# Patient Record
Sex: MALE | Employment: FULL TIME | URBAN - METROPOLITAN AREA
[De-identification: names, ages, dates, MRNs, and addresses within clinical notes are randomized per-mention and may not be internally consistent; named-entity substitution may affect disease eponyms.]

---

## 2019-10-09 ENCOUNTER — HOSPITAL ENCOUNTER (EMERGENCY)
Age: 59
Discharge: HOME | End: 2019-10-10
Attending: EMERGENCY MEDICINE
Payer: COMMERCIAL

## 2019-10-09 ENCOUNTER — APPOINTMENT (OUTPATIENT)
Dept: CT IMAGING | Age: 59
End: 2019-10-09
Attending: STUDENT IN AN ORGANIZED HEALTH CARE EDUCATION/TRAINING PROGRAM
Payer: COMMERCIAL

## 2019-10-09 ENCOUNTER — APPOINTMENT (OUTPATIENT)
Dept: GENERAL RADIOLOGY | Age: 59
End: 2019-10-09
Attending: STUDENT IN AN ORGANIZED HEALTH CARE EDUCATION/TRAINING PROGRAM
Payer: COMMERCIAL

## 2019-10-09 DIAGNOSIS — M54.9 ACUTE BACK PAIN LESS THAN 4 WEEKS DURATION: Primary | ICD-10-CM

## 2019-10-09 LAB
ALBUMIN SERPL-MCNC: 4.1 G/DL (ref 3.4–5)
ALBUMIN/GLOB SERPL: 1.1 {RATIO} (ref 0.8–1.7)
ALP SERPL-CCNC: 118 U/L (ref 45–117)
ALT SERPL-CCNC: 49 U/L (ref 16–61)
ANION GAP SERPL CALC-SCNC: 5 MMOL/L (ref 3–18)
APPEARANCE UR: CLEAR
AST SERPL-CCNC: 16 U/L (ref 10–38)
BACTERIA URNS QL MICRO: NEGATIVE /HPF
BASOPHILS # BLD: 0 K/UL (ref 0–0.1)
BASOPHILS NFR BLD: 1 % (ref 0–2)
BILIRUB SERPL-MCNC: 0.3 MG/DL (ref 0.2–1)
BILIRUB UR QL: NEGATIVE
BUN SERPL-MCNC: 16 MG/DL (ref 7–18)
BUN/CREAT SERPL: 20 (ref 12–20)
CALCIUM SERPL-MCNC: 8.8 MG/DL (ref 8.5–10.1)
CHLORIDE SERPL-SCNC: 106 MMOL/L (ref 100–111)
CO2 SERPL-SCNC: 26 MMOL/L (ref 21–32)
COLOR UR: YELLOW
CREAT SERPL-MCNC: 0.8 MG/DL (ref 0.6–1.3)
CRP SERPL-MCNC: <0.3 MG/DL (ref 0–0.3)
DIFFERENTIAL METHOD BLD: ABNORMAL
EOSINOPHIL # BLD: 0.1 K/UL (ref 0–0.4)
EOSINOPHIL NFR BLD: 2 % (ref 0–5)
EPITH CASTS URNS QL MICRO: ABNORMAL /LPF (ref 0–5)
ERYTHROCYTE [DISTWIDTH] IN BLOOD BY AUTOMATED COUNT: 13 % (ref 11.6–14.5)
GLOBULIN SER CALC-MCNC: 3.8 G/DL (ref 2–4)
GLUCOSE SERPL-MCNC: 119 MG/DL (ref 74–99)
GLUCOSE UR STRIP.AUTO-MCNC: NEGATIVE MG/DL
HCT VFR BLD AUTO: 52.1 % (ref 36–48)
HGB BLD-MCNC: 18.5 G/DL (ref 13–16)
HGB UR QL STRIP: ABNORMAL
KETONES UR QL STRIP.AUTO: NEGATIVE MG/DL
LEUKOCYTE ESTERASE UR QL STRIP.AUTO: NEGATIVE
LIPASE SERPL-CCNC: 94 U/L (ref 73–393)
LYMPHOCYTES # BLD: 1.4 K/UL (ref 0.9–3.6)
LYMPHOCYTES NFR BLD: 20 % (ref 21–52)
MCH RBC QN AUTO: 30.9 PG (ref 24–34)
MCHC RBC AUTO-ENTMCNC: 35.5 G/DL (ref 31–37)
MCV RBC AUTO: 87.1 FL (ref 74–97)
MONOCYTES # BLD: 0.5 K/UL (ref 0.05–1.2)
MONOCYTES NFR BLD: 7 % (ref 3–10)
MUCOUS THREADS URNS QL MICRO: ABNORMAL /LPF
NEUTS SEG # BLD: 5.2 K/UL (ref 1.8–8)
NEUTS SEG NFR BLD: 70 % (ref 40–73)
NITRITE UR QL STRIP.AUTO: NEGATIVE
PH UR STRIP: 5.5 [PH] (ref 5–8)
PLATELET # BLD AUTO: 213 K/UL (ref 135–420)
PMV BLD AUTO: 10.2 FL (ref 9.2–11.8)
POTASSIUM SERPL-SCNC: 3.7 MMOL/L (ref 3.5–5.5)
PROT SERPL-MCNC: 7.9 G/DL (ref 6.4–8.2)
PROT UR STRIP-MCNC: ABNORMAL MG/DL
RBC # BLD AUTO: 5.98 M/UL (ref 4.7–5.5)
RBC #/AREA URNS HPF: NEGATIVE /HPF (ref 0–5)
SODIUM SERPL-SCNC: 137 MMOL/L (ref 136–145)
SP GR UR REFRACTOMETRY: 1.02 (ref 1–1.03)
TROPONIN I SERPL-MCNC: <0.02 NG/ML (ref 0–0.04)
UROBILINOGEN UR QL STRIP.AUTO: 1 EU/DL (ref 0.2–1)
WBC # BLD AUTO: 7.3 K/UL (ref 4.6–13.2)
WBC URNS QL MICRO: ABNORMAL /HPF (ref 0–4)

## 2019-10-09 PROCEDURE — 96374 THER/PROPH/DIAG INJ IV PUSH: CPT

## 2019-10-09 PROCEDURE — 74176 CT ABD & PELVIS W/O CONTRAST: CPT

## 2019-10-09 PROCEDURE — 99284 EMERGENCY DEPT VISIT MOD MDM: CPT

## 2019-10-09 PROCEDURE — 81001 URINALYSIS AUTO W/SCOPE: CPT

## 2019-10-09 PROCEDURE — 80053 COMPREHEN METABOLIC PANEL: CPT

## 2019-10-09 PROCEDURE — 74011250636 HC RX REV CODE- 250/636: Performed by: STUDENT IN AN ORGANIZED HEALTH CARE EDUCATION/TRAINING PROGRAM

## 2019-10-09 PROCEDURE — 74011250637 HC RX REV CODE- 250/637: Performed by: STUDENT IN AN ORGANIZED HEALTH CARE EDUCATION/TRAINING PROGRAM

## 2019-10-09 PROCEDURE — 86140 C-REACTIVE PROTEIN: CPT

## 2019-10-09 PROCEDURE — 85025 COMPLETE CBC W/AUTO DIFF WBC: CPT

## 2019-10-09 PROCEDURE — 96375 TX/PRO/DX INJ NEW DRUG ADDON: CPT

## 2019-10-09 PROCEDURE — 74011250636 HC RX REV CODE- 250/636: Performed by: EMERGENCY MEDICINE

## 2019-10-09 PROCEDURE — 74011250637 HC RX REV CODE- 250/637: Performed by: EMERGENCY MEDICINE

## 2019-10-09 PROCEDURE — 74011250637 HC RX REV CODE- 250/637: Performed by: PHYSICIAN ASSISTANT

## 2019-10-09 PROCEDURE — 71045 X-RAY EXAM CHEST 1 VIEW: CPT

## 2019-10-09 PROCEDURE — 84484 ASSAY OF TROPONIN QUANT: CPT

## 2019-10-09 PROCEDURE — 83690 ASSAY OF LIPASE: CPT

## 2019-10-09 RX ORDER — ONDANSETRON 4 MG/1
4 TABLET, ORALLY DISINTEGRATING ORAL
Status: COMPLETED | OUTPATIENT
Start: 2019-10-09 | End: 2019-10-09

## 2019-10-09 RX ORDER — LIDOCAINE 4 G/100G
1 PATCH TOPICAL EVERY 24 HOURS
Status: DISCONTINUED | OUTPATIENT
Start: 2019-10-09 | End: 2019-10-10 | Stop reason: HOSPADM

## 2019-10-09 RX ORDER — LABETALOL HCL 20 MG/4 ML
10 SYRINGE (ML) INTRAVENOUS
Status: COMPLETED | OUTPATIENT
Start: 2019-10-09 | End: 2019-10-09

## 2019-10-09 RX ORDER — HYDROMORPHONE HYDROCHLORIDE 1 MG/ML
0.5 INJECTION, SOLUTION INTRAMUSCULAR; INTRAVENOUS; SUBCUTANEOUS ONCE
Status: DISCONTINUED | OUTPATIENT
Start: 2019-10-09 | End: 2019-10-09

## 2019-10-09 RX ORDER — LIDOCAINE 50 MG/G
PATCH TOPICAL
Qty: 8 EACH | Refills: 0 | Status: SHIPPED | OUTPATIENT
Start: 2019-10-09

## 2019-10-09 RX ORDER — ACETAMINOPHEN 500 MG
1000 TABLET ORAL
Status: COMPLETED | OUTPATIENT
Start: 2019-10-09 | End: 2019-10-09

## 2019-10-09 RX ORDER — KETOROLAC TROMETHAMINE 15 MG/ML
15 INJECTION, SOLUTION INTRAMUSCULAR; INTRAVENOUS
Status: COMPLETED | OUTPATIENT
Start: 2019-10-09 | End: 2019-10-09

## 2019-10-09 RX ORDER — OXYCODONE AND ACETAMINOPHEN 5; 325 MG/1; MG/1
1 TABLET ORAL
Status: COMPLETED | OUTPATIENT
Start: 2019-10-09 | End: 2019-10-09

## 2019-10-09 RX ORDER — METOPROLOL TARTRATE 50 MG/1
50 TABLET ORAL ONCE
Status: COMPLETED | OUTPATIENT
Start: 2019-10-09 | End: 2019-10-09

## 2019-10-09 RX ORDER — IBUPROFEN 600 MG/1
600 TABLET ORAL
Qty: 20 TAB | Refills: 0 | Status: SHIPPED | OUTPATIENT
Start: 2019-10-09

## 2019-10-09 RX ORDER — MORPHINE SULFATE 4 MG/ML
4 INJECTION, SOLUTION INTRAMUSCULAR; INTRAVENOUS
Status: COMPLETED | OUTPATIENT
Start: 2019-10-09 | End: 2019-10-09

## 2019-10-09 RX ORDER — LABETALOL HYDROCHLORIDE 5 MG/ML
20 INJECTION, SOLUTION INTRAVENOUS ONCE
Status: DISCONTINUED | OUTPATIENT
Start: 2019-10-09 | End: 2019-10-09

## 2019-10-09 RX ORDER — OXYCODONE HYDROCHLORIDE 5 MG/1
5 TABLET ORAL
Qty: 7 TAB | Refills: 0 | Status: SHIPPED | OUTPATIENT
Start: 2019-10-09 | End: 2019-10-12

## 2019-10-09 RX ADMIN — OXYCODONE HYDROCHLORIDE AND ACETAMINOPHEN 1 TABLET: 5; 325 TABLET ORAL at 20:46

## 2019-10-09 RX ADMIN — LABETALOL 20 MG/4 ML (5 MG/ML) INTRAVENOUS SYRINGE 10 MG: at 20:47

## 2019-10-09 RX ADMIN — ONDANSETRON 4 MG: 4 TABLET, ORALLY DISINTEGRATING ORAL at 15:07

## 2019-10-09 RX ADMIN — LABETALOL 20 MG/4 ML (5 MG/ML) INTRAVENOUS SYRINGE 20 MG: at 20:46

## 2019-10-09 RX ADMIN — ACETAMINOPHEN 1000 MG: 500 TABLET ORAL at 20:06

## 2019-10-09 RX ADMIN — SODIUM CHLORIDE 1000 ML: 900 INJECTION, SOLUTION INTRAVENOUS at 22:45

## 2019-10-09 RX ADMIN — MORPHINE SULFATE 4 MG: 4 INJECTION, SOLUTION INTRAMUSCULAR; INTRAVENOUS at 22:45

## 2019-10-09 RX ADMIN — METOPROLOL TARTRATE 50 MG: 50 TABLET ORAL at 22:45

## 2019-10-09 RX ADMIN — KETOROLAC TROMETHAMINE 15 MG: 15 INJECTION, SOLUTION INTRAMUSCULAR; INTRAVENOUS at 20:07

## 2019-10-09 NOTE — ED PROVIDER NOTES
EMERGENCY DEPARTMENT HISTORY AND PHYSICAL EXAM    6:04 PM      Date: 10/9/2019  Patient Name: Dianna Jamison    History of Presenting Illness     Chief Complaint   Patient presents with    Back Pain    Abdominal Pain    Testicle Pain     left         History Provided By: Patient  Location/Duration/Severity/Modifying factors   HPI     61-year-old male with a history of HTN,  nephrolithiasis, multiple back surgeries who presents to the ED with a one-week history of progressively worsening left back pain located at approximately the SI/left paraspinal region for us today. Patient states that he was seen at [facility] yesterday for flank and testicular pain. He was given Dilaudid, Toradol and Zofran. The CT abdomen pelvis showed bilateral stones in the kidney but nothing in the ureter. The ultrasound of the testicles were also negative. 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 He pinpoints his pain over the left sacroiliac joint on the bone/over the bone itself. The patient was discharged to follow-up with his back surgeon, as it was deemed more consistent with back pain. However, he states that he is still in a lot of pain. Patient states he has a history of loss of bowel or bladder function, and required emergent surgery to decompress his spine, this was done by Dr. Shashi Harp at [facility]. He denies any fever. He states that this feels like a \"slipped disc\". He denies any history of vascular disease, including aortic dissection. He denies any issues with achieving or sustaining an erection. He denies any h/o intermittent claudication. He denies any changes in bladder function. He   He denies any loss of stool inadvertently. Patient had a negative straight leg test bilaterally. He had good rectal tone.  He was able to walk here, however he states that he has not been able to lift his LEFT foot or leg and has been having progressive issues ambulating. He was able to contract both quadriceps with equal strength bilaterally, and raise both of his legs against resistance. Exam was reassuring against acute cord compression syndrome, sciatica, or fracture. PCP: Omid Younger MD    Current Facility-Administered Medications   Medication Dose Route Frequency Provider Last Rate Last Dose    ketorolac (TORADOL) injection 15 mg  15 mg IntraVENous NOW Sangeetha Thomas MD        acetaminophen (TYLENOL) tablet 1,000 mg  1,000 mg Oral NOW Sangeetha Thomas MD        HYDROmorphone (DILAUDID) syringe 0.5 mg  0.5 mg IntraVENous ONCE Sangeetha Thomas MD         Current Outpatient Medications   Medication Sig Dispense Refill    tamsulosin (FLOMAX) 0.4 mg capsule Take 1 Cap by mouth daily (after dinner). 30 Cap 0    oxyCODONE-acetaminophen (ROXICET) 5-325 mg per tablet Take 1 Tab by mouth every six (6) hours as needed for Pain. Max Daily Amount: 4 Tabs. 25 Tab 0    tamsulosin (FLOMAX) 0.4 mg capsule   0    HYDROcodone-acetaminophen (NORCO)  mg tablet Take 1 Tab by mouth every six (6) hours as needed for Pain. Max Daily Amount: 4 Tabs. 30 Tab 0       Past History     Past Medical History:  Past Medical History:   Diagnosis Date    Difficulty walking     HX OTHER MEDICAL     thin stomach lining    Insomnia     Nephrolithiasis     Numbness in both legs     Recurrent kidney stones     Snoring     Weakness        Past Surgical History:  Past Surgical History:   Procedure Laterality Date    HX BACK SURGERY      several    HX CHOLECYSTECTOMY      HX HERNIA REPAIR      x 15       Family History:  No family history on file. Social History:  Social History     Tobacco Use    Smoking status: Former Smoker    Smokeless tobacco: Never Used   Substance Use Topics    Alcohol use:  Yes     Alcohol/week: 0.0 standard drinks    Drug use: No     Types: Marijuana       Allergies:  No Known Allergies      Review of Systems Review of Systems   Constitutional: Positive for appetite change. Negative for activity change, chills, diaphoresis, fatigue, fever and unexpected weight change. HENT: Negative. Eyes: Negative. Respiratory: Negative. Cardiovascular: Negative. Gastrointestinal: Positive for nausea. Genitourinary: Negative for difficulty urinating. Musculoskeletal: Positive for back pain. Negative for gait problem. Skin: Negative. Neurological: Negative. Physical Exam     Visit Vitals  BP (!) 184/115 (BP 1 Location: Left arm, BP Patient Position: Sitting)   Pulse (!) 112   Temp 98.6 °F (37 °C)   Resp 20   Wt 83.9 kg (185 lb)   SpO2 96%   BMI 26.54 kg/m²         Physical Exam   Constitutional: He is oriented to person, place, and time. He appears well-developed and well-nourished. He appears distressed. HENT:   Head: Normocephalic and atraumatic. Eyes: Pupils are equal, round, and reactive to light. Neck: No JVD present. No tracheal deviation present. Cardiovascular: Regular rhythm. Exam reveals no gallop and no friction rub. No murmur heard. Pulmonary/Chest: Effort normal. No respiratory distress. He has no wheezes. He has no rales. He exhibits no tenderness. Abdominal: Soft. He exhibits no distension. There is no tenderness. Genitourinary:   Genitourinary Comments: Rectal tone intact   Musculoskeletal: He exhibits no deformity. Neurological: He is alert and oriented to person, place, and time. No cranial nerve deficit. Strength 5/5 in both extremities  Sensation intact   Skin: Skin is warm and dry.          Diagnostic Study Results     Labs -  Recent Results (from the past 12 hour(s))   CBC WITH AUTOMATED DIFF    Collection Time: 10/09/19  3:07 PM   Result Value Ref Range    WBC 7.3 4.6 - 13.2 K/uL    RBC 5.98 (H) 4.70 - 5.50 M/uL    HGB 18.5 (H) 13.0 - 16.0 g/dL    HCT 52.1 (H) 36.0 - 48.0 %    MCV 87.1 74.0 - 97.0 FL    MCH 30.9 24.0 - 34.0 PG    MCHC 35.5 31.0 - 37.0 g/dL RDW 13.0 11.6 - 14.5 %    PLATELET 180 886 - 133 K/uL    MPV 10.2 9.2 - 11.8 FL    NEUTROPHILS 70 40 - 73 %    LYMPHOCYTES 20 (L) 21 - 52 %    MONOCYTES 7 3 - 10 %    EOSINOPHILS 2 0 - 5 %    BASOPHILS 1 0 - 2 %    ABS. NEUTROPHILS 5.2 1.8 - 8.0 K/UL    ABS. LYMPHOCYTES 1.4 0.9 - 3.6 K/UL    ABS. MONOCYTES 0.5 0.05 - 1.2 K/UL    ABS. EOSINOPHILS 0.1 0.0 - 0.4 K/UL    ABS. BASOPHILS 0.0 0.0 - 0.1 K/UL    DF AUTOMATED     METABOLIC PANEL, COMPREHENSIVE    Collection Time: 10/09/19  3:07 PM   Result Value Ref Range    Sodium 137 136 - 145 mmol/L    Potassium 3.7 3.5 - 5.5 mmol/L    Chloride 106 100 - 111 mmol/L    CO2 26 21 - 32 mmol/L    Anion gap 5 3.0 - 18 mmol/L    Glucose 119 (H) 74 - 99 mg/dL    BUN 16 7.0 - 18 MG/DL    Creatinine 0.80 0.6 - 1.3 MG/DL    BUN/Creatinine ratio 20 12 - 20      GFR est AA >60 >60 ml/min/1.73m2    GFR est non-AA >60 >60 ml/min/1.73m2    Calcium 8.8 8.5 - 10.1 MG/DL    Bilirubin, total 0.3 0.2 - 1.0 MG/DL    ALT (SGPT) 49 16 - 61 U/L    AST (SGOT) 16 10 - 38 U/L    Alk. phosphatase 118 (H) 45 - 117 U/L    Protein, total 7.9 6.4 - 8.2 g/dL    Albumin 4.1 3.4 - 5.0 g/dL    Globulin 3.8 2.0 - 4.0 g/dL    A-G Ratio 1.1 0.8 - 1.7     LIPASE    Collection Time: 10/09/19  3:07 PM   Result Value Ref Range    Lipase 94 73 - 393 U/L       Radiologic Studies -   XR CHEST PORT    (Results Pending)         Medical Decision Making   I am the first provider for this patient. I reviewed the vital signs, available nursing notes, past medical history, past surgical history, family history and social history. Vital Signs-Reviewed the patient's vital signs.       EKG: reviewed    Records Reviewed: Nursing Notes, Old Medical Records, Previous Radiology Studies and Previous Laboratory Studies (Time of Review: 6:04 PM)    ED Course: Progress Notes, Reevaluation, and Consults:    ED Course as of Oct 09 2200   Wed Oct 09, 2019   1803 Lab results reviewed, patient's BMP within normal limits, BUN/creatinine 16/0.8.    [SH]   2039 Patient has elevated HTN without symptoms (states nausea associated with back pain), Per Dr. Rickie Tubbs will lower his BP with 10mg labetalol and give him his 10mg lopressor  before discharge. Patient states he would also like us to examine his testicles (?), has also stated that the 8 tabs (norco and roxicet) that he was given yesterday are gone.     [SH]   2139 Stated that he began to have excruciating pain traveling into his testicle. Nontender to palpation, I did a thorough testicle and penile exam, no evidence of shingles-like lesions, or discharge from meatus. No inguinal hernias appreciated bilaterally. Given the fact the patient is endorsing continued 10 out of 10 pain even with pain medications, we are pressed to do a CT abdomen pelvis. Discussed the risk of radiation with patient, he said \"do whatever it takes\". He also stated \"I can't go home with this pain\"    [SH]   2156 States that he has a history of cord compression with loss of bowel and bladder function and needed emergent decompressive surgery by Dr. Bala Flaherty some years ago. I corroborated these findings with his wife. Patient does not have any findings of cord compression at present. Strength 5/5 BL, intact sensation, normal rectal tone.   [SH]      ED Course User Index  [SH] Sydni Freitas MD         Provider Notes (Medical Decision Making):   MDM  Number of Diagnoses or Management Options  Acute back pain less than 4 weeks duration:   Diagnosis management comments: 61-year-old male with a history of nephrolithiasis, extensive back surgery, and one episode of cord compression syndrome with loss of bowel and bladder function many years ago that required an emergent decompression by Dr. Bala Flaherty at [facility]. Today he presents to the ED with acute onset back pain increasing in intensity for the past week.   He was recently seen at [facility], and a CT abdomen pelvis as well as testicular ultrasound was performed to rule out torsion, and acute nephrolithiasis given patient's presentation. Overall work-up was more suggestive of back pain than intra-abdominal or genitourinary pathology. Patient was discharged with pain medication, however return to the [facility] the ED and worse pain. requires us #1 to rule out kidney stone migration with repeat CT abdo/pelvis and #2, obtain close follow-up with a spine surgeon/back specialist.   Patient with normal sensation, full strength bilaterally and normal rectal tone. No clinical findings suggesting cord compression,  Signed out to Dr. Emanuel Chong pending results of CT abdomen pelvis. If negative, patient may be discharged home with printed prescriptions for pain management to include Motrin, lidocaine patches and limited amount of oxycodone (7 pills of 5 mg) for breakthrough back pain. Patient will follow-up with Dr. Lois Dunne at his office on Friday. He has been given follow-up instructions, and strict return precautions. He demonstrated understanding. I personally saw and examined the patient. I have reviewed and agree with the residents findings, including all diagnostic interpretations, and plans as written. I was present during the key portions of separately billed procedures.   Felipe Ontiveros MD         Amount and/or Complexity of Data Reviewed  Clinical lab tests: ordered and reviewed  Tests in the radiology section of CPT®: ordered and reviewed  Tests in the medicine section of CPT®: ordered and reviewed  Discussion of test results with the performing providers: yes  Decide to obtain previous medical records or to obtain history from someone other than the patient: yes  Obtain history from someone other than the patient: yes  Review and summarize past medical records: yes  Discuss the patient with other providers: yes  Independent visualization of images, tracings, or specimens: yes        Procedures    Critical Care Time: 60      Diagnosis     Clinical Impression: No diagnosis found.    Disposition: discharge    Follow-up Information    None          Patient's Medications   Start Taking    No medications on file   Continue Taking    HYDROCODONE-ACETAMINOPHEN (NORCO)  MG TABLET    Take 1 Tab by mouth every six (6) hours as needed for Pain. Max Daily Amount: 4 Tabs. OXYCODONE-ACETAMINOPHEN (ROXICET) 5-325 MG PER TABLET    Take 1 Tab by mouth every six (6) hours as needed for Pain. Max Daily Amount: 4 Tabs. TAMSULOSIN (FLOMAX) 0.4 MG CAPSULE        TAMSULOSIN (FLOMAX) 0.4 MG CAPSULE    Take 1 Cap by mouth daily (after dinner). These Medications have changed    No medications on file   Stop Taking    No medications on file     Disclaimer: Sections of this note are dictated using utilizing voice recognition software. Minor typographical errors may be present. If questions arise, please do not hesitate to contact me or call our department.

## 2019-10-09 NOTE — ED TRIAGE NOTES
\"I went to Dr. Elmer Wilson office and he told me to come here; I'm having severe back pain. \" Pt.  Also c/o left flank and testicle pain along with N/V.

## 2019-10-10 VITALS
RESPIRATION RATE: 15 BRPM | WEIGHT: 185 LBS | BODY MASS INDEX: 26.54 KG/M2 | OXYGEN SATURATION: 95 % | SYSTOLIC BLOOD PRESSURE: 155 MMHG | HEART RATE: 72 BPM | TEMPERATURE: 99.4 F | DIASTOLIC BLOOD PRESSURE: 100 MMHG

## 2019-10-10 NOTE — DISCHARGE INSTRUCTIONS
Patient Education        Learning About Relief for Back Pain  What is back tension and strain? Back strain happens when you overstretch, or pull, a muscle in your back. You may hurt your back in an accident or when you exercise or lift something. Most back pain will get better with rest and time. You can take care of yourself at home to help your back heal.  What can you do first to relieve back pain? When you first feel back pain, try these steps:  · Walk. Take a short walk (10 to 20 minutes) on a level surface (no slopes, hills, or stairs) every 2 to 3 hours. Walk only distances you can manage without pain, especially leg pain. · Relax. Find a comfortable position for rest. Some people are comfortable on the floor or a medium-firm bed with a small pillow under their head and another under their knees. Some people prefer to lie on their side with a pillow between their knees. Don't stay in one position for too long. · Try heat or ice. Try using a heating pad on a low or medium setting, or take a warm shower, for 15 to 20 minutes every 2 to 3 hours. Or you can buy single-use heat wraps that last up to 8 hours. You can also try an ice pack for 10 to 15 minutes every 2 to 3 hours. You can use an ice pack or a bag of frozen vegetables wrapped in a thin towel. There is not strong evidence that either heat or ice will help, but you can try them to see if they help. You may also want to try switching between heat and cold. · Take pain medicine exactly as directed. ? If the doctor gave you a prescription medicine for pain, take it as prescribed. ? If you are not taking a prescription pain medicine, ask your doctor if you can take an over-the-counter medicine. What else can you do? · Stretch and exercise. Exercises that increase flexibility may relieve your pain and make it easier for your muscles to keep your spine in a good, neutral position. And don't forget to keep walking. · Do self-massage.  You can use self-massage to unwind after work or school or to energize yourself in the morning. You can easily massage your feet, hands, or neck. Self-massage works best if you are in comfortable clothes and are sitting or lying in a comfortable position. Use oil or lotion to massage bare skin. · Reduce stress. Back pain can lead to a vicious [place]: Distress about the pain tenses the muscles in your back, which in turn causes more pain. Learn how to relax your mind and your muscles to lower your stress. Where can you learn more? Go to http://earline-edel.info/. Enter U267 in the search box to learn more about \"Learning About Relief for Back Pain. \"  Current as of: June 26, 2019  Content Version: 12.2  © 8393-7149 [employer]. Care instructions adapted under license by [employer] (which disclaims liability or warranty for this information). If you have questions about a medical condition or this instruction, always ask your healthcare professional. [address] any warranty or liability for your use of this information.

## 2019-10-11 ENCOUNTER — HOSPITAL ENCOUNTER (EMERGENCY)
Age: 59
Discharge: HOME | End: 2019-10-11
Attending: EMERGENCY MEDICINE
Payer: COMMERCIAL

## 2019-10-11 ENCOUNTER — APPOINTMENT (OUTPATIENT)
Dept: MRI IMAGING | Age: 59
End: 2019-10-11
Attending: EMERGENCY MEDICINE
Payer: COMMERCIAL

## 2019-10-11 ENCOUNTER — OFFICE VISIT (OUTPATIENT)
Dept: ORTHOPEDIC SURGERY | Age: 59
End: 2019-10-11

## 2019-10-11 VITALS
BODY MASS INDEX: 26.49 KG/M2 | HEIGHT: 71 IN | RESPIRATION RATE: 20 BRPM | WEIGHT: 189.2 LBS | OXYGEN SATURATION: 97 % | TEMPERATURE: 98 F | HEART RATE: 67 BPM | SYSTOLIC BLOOD PRESSURE: 192 MMHG | DIASTOLIC BLOOD PRESSURE: 136 MMHG

## 2019-10-11 DIAGNOSIS — N50.812 TESTICULAR PAIN, LEFT: ICD-10-CM

## 2019-10-11 DIAGNOSIS — M21.372 FOOT DROP, LEFT: ICD-10-CM

## 2019-10-11 DIAGNOSIS — I10 ESSENTIAL HYPERTENSION: ICD-10-CM

## 2019-10-11 DIAGNOSIS — R29.898 LEFT LEG WEAKNESS: Primary | ICD-10-CM

## 2019-10-11 DIAGNOSIS — G89.29 CHRONIC LEFT-SIDED LOW BACK PAIN WITHOUT SCIATICA: Primary | ICD-10-CM

## 2019-10-11 DIAGNOSIS — M54.50 CHRONIC LEFT-SIDED LOW BACK PAIN WITHOUT SCIATICA: Primary | ICD-10-CM

## 2019-10-11 PROCEDURE — A9575 INJ GADOTERATE MEGLUMI 0.1ML: HCPCS | Performed by: EMERGENCY MEDICINE

## 2019-10-11 PROCEDURE — 99284 EMERGENCY DEPT VISIT MOD MDM: CPT

## 2019-10-11 PROCEDURE — 74011250636 HC RX REV CODE- 250/636: Performed by: EMERGENCY MEDICINE

## 2019-10-11 PROCEDURE — 74011250637 HC RX REV CODE- 250/637: Performed by: EMERGENCY MEDICINE

## 2019-10-11 PROCEDURE — 72158 MRI LUMBAR SPINE W/O & W/DYE: CPT

## 2019-10-11 PROCEDURE — 96374 THER/PROPH/DIAG INJ IV PUSH: CPT

## 2019-10-11 RX ORDER — GADOTERATE MEGLUMINE 376.9 MG/ML
20 INJECTION INTRAVENOUS
Status: DISCONTINUED | OUTPATIENT
Start: 2019-10-11 | End: 2019-10-11 | Stop reason: HOSPADM

## 2019-10-11 RX ORDER — METOPROLOL TARTRATE 50 MG/1
50 TABLET ORAL
Status: COMPLETED | OUTPATIENT
Start: 2019-10-11 | End: 2019-10-11

## 2019-10-11 RX ORDER — CIPROFLOXACIN 500 MG/1
TABLET ORAL
Refills: 0 | COMMUNITY
Start: 2019-10-03

## 2019-10-11 RX ORDER — MORPHINE SULFATE 4 MG/ML
4 INJECTION, SOLUTION INTRAMUSCULAR; INTRAVENOUS
Status: COMPLETED | OUTPATIENT
Start: 2019-10-11 | End: 2019-10-11

## 2019-10-11 RX ORDER — METOPROLOL TARTRATE 50 MG/1
TABLET ORAL
Refills: 3 | COMMUNITY
Start: 2019-10-03

## 2019-10-11 RX ADMIN — MORPHINE SULFATE 4 MG: 4 INJECTION, SOLUTION INTRAMUSCULAR; INTRAVENOUS at 14:52

## 2019-10-11 RX ADMIN — METOPROLOL TARTRATE 50 MG: 50 TABLET ORAL at 10:27

## 2019-10-11 RX ADMIN — GADOTERATE MEGLUMINE 18 ML: 376.9 INJECTION INTRAVENOUS at 15:50

## 2019-10-11 NOTE — ED TRIAGE NOTES
Per EMS the patient is hypertensive 212/128 patient has been off his medication due to not being able to afford the medication

## 2019-10-11 NOTE — PROGRESS NOTES
[address]  [address], [address]  [place], [address] [place] [address] Street  Phone: [phone]  Fax: [phone]  NEW PATIENT  Patient: Odalis French                MRN: [id number]       SSN: [id number]  YOB: 1960        AGE: 64 y.o. SEX: male  Body mass index is 26.39 kg/m². PCP: Edenilson Khanna MD  10/11/19    Chief Complaint   Patient presents with    New Patient    Leg Pain     left    Buttocks pain     left    Back Pain     left     Odalis French is seen today in consultation at the request of ER for complaints of Back pain     HISTORY OF PRESENT ILLNESS:  Odalis French is a 64 y.o.  male with history of chronic back pain for 20 years. He has been having more acute Left-sided leg pain and diffuse weakness in all muscle groups rated as a 2/5 in his LLE for the last week or so without any injury. He has been to 2 diff ERs w/ 3 diff visits. ABD CT shows kidney stones with blood in UA. Old MRIs of CS and LS from 2006 and 2008 show mild cervical and lumbar spondylosis, no stenosis, some facet arthropathy. Prior history of back problems: previous spinal surgery - a lumbar decompression by Dr. Radha Macdonald about 20 yrs ago that initially helped with his pain. He comes in with a BP in the 200s/130s. He reports it was this high a few days ago in the ER. He was started on Lopressor. He was previously in PM on high doses on narcotics, now opioids don't touch his pain. He has had NSAIDs and Oxy from ER-no benefit. He reports Left-sided testicular pain, no bladder/bowel issues. He presents in a wheelchair unable to ambulate, or lift his leg up, significant weakness. He has tried; PT-No,  Non-opioid medications Yes, and spinal injections No. Pain is aching, burning and numbing. Pain is worse with walking and affects sleep, work and recreational activities  and his ability to perform ADLs. Pain is better with nothing. Denies bladder/bowel dysfunction. Denies chills, fever,night sweats, unexplained weight loss/weight gain, chest pain, sob or anxiety. Pt at this time desires to  continue with current care/proceed with medication evaluation/proceed with evaluation of back pain. Medications Previously in Pain Management    PMHx: kidney stones      ASSESSMENT   Diagnoses and all orders for this visit:    1. Left leg weakness    2. Testicular pain, left         IMPRESSION AND PLAN:  This is a pt with progressive back and LLE pain and weakness that needs a STAT MRI to r/o urgent surgical intervention. With a BP as high as his, he needs to be transported to the ER to receive IV medications in order to even tolerate an MRI. We called an ambulance for him, he was taken to [facility] ER. I called ER PA and spoke with him, I gave him my recommendation for a STAT MRI, He did not agree I was told that that was not a reason to hold -up an ER Bed. I had Dr. Stephanie Bolden speak with the physician in the ER, they will do an MRI at 1pm.       > Pt was given information on    > Ambulance called, transported to [facility], STAT MRI and BP control  > Mr. Shelton Bradley has a reminder for a \"due or due soon\" health maintenance. I have asked that he contact his primary care provider, Pauline Guerrero MD, for follow-up on this health maintenance.  > We have informed patient to notify us for immediate appointment if he has any worsening neurogical symptoms or if an emergency situation presents, then call 911  >  has been reviewed and is appropriate  > Pt will follow-up in in ER. Subjective      Pain Scale: 10 - Worst pain ever/10    Pain Assessment  10/11/2019   Location of Pain Back;Leg;Buttocks   Location Modifiers Left   Severity of Pain 10   Quality of Pain Aching   Quality of Pain Comment stabbing   Duration of Pain Persistent   Frequency of Pain Constant   Aggravating Factors Other (Comment); Standing;Walking   Aggravating Factors Comment sitting   Limiting Behavior Yes   Relieving Factors (No Data) Relieving Factors Comment laying on left side         REVIEW OF SYSTEMS  Constitutional: Negative for fever, chills, or weight change. Respiratory: Negative for cough or shortness of breath. Cardiovascular: Negative for chest pain or palpitations. Gastrointestinal: Negative for incontinence, acid reflux, change in bowel habits, or constipation. Genitourinary: Negative for incontinence, dysuria and flank pain. L testicular pain  Musculoskeletal: Positive for back and LL pain. See HPI. Skin: Negative for rash. Neurological:LLE in the L5 distribution, diffuse LLE weakness. See HPI. Endo/Heme/Allergies: Negative. Psychiatric/Behavioral: Negative. PHYSICAL EXAMINATION  Visit Vitals  BP (!) 192/136 (BP 1 Location: Left arm, BP Patient Position: Sitting)   Pulse 67   Temp 98 °F (36.7 °C) (Oral)   Resp 20   Ht 5' 11\" (1.803 m)   Wt 189 lb 3.2 oz (85.8 kg)   SpO2 97%   BMI 26.39 kg/m²         Accompanied by self. Constitutional:  Well developed, well nourished, in no acute distress. Psychiatric: Affect and mood are appropriate. Integumentary: No rashes or abrasions noted on exposed areas. Cardiovascular/Peripheral Vascular: +2 radial & pedal pulses. No peripheral edema is noted. Lymphatic:  No evidence of lymphedema. No cervical lymphadenopathy. SPINE/MUSCULOSKELETAL EXAM     Thoracic spine:  Tenderness to palpation none. Lumbar spine:  No rash, ecchymosis, or gross obliquity. No fasciculations. No focal atrophy is noted. Range of motion is unable to perform. Tenderness to palpation L>R LBP L4-S1 region. No tenderness to palpation at the sciatic notch. SI joints non-tender. Trochanters non tender.       Straight leg raise too weak to perform on L    Sensation grossly Decreased to LLE      MOTOR:     Hip Flex Quads Hamstrings Ankle DF EHL Ankle PF   Right 5/5 5/5 5/5 5/5 5/5 5/5   Left 1-2/5 1/5 1/5 2-3/5 2-3/5 2-3/5       DTRs are 1+ biceps, triceps, brachioradialis, patella, and Achilles. Ambulation Presents in wheelchair. PAST MEDICAL HISTORY   Past Medical History:   Diagnosis Date    Difficulty walking     HX OTHER MEDICAL     thin stomach lining    Hypertension     Insomnia     Nephrolithiasis     Numbness in both legs     Recurrent kidney stones     Snoring     Weakness        Past Surgical History:   Procedure Laterality Date    HX BACK SURGERY      several    HX CHOLECYSTECTOMY      HX HERNIA REPAIR      x 15   . MEDICATIONS      Current Outpatient Medications   Medication Sig Dispense Refill    ciprofloxacin HCl (CIPRO) 500 mg tablet TK 1 T PO TWICE DAILY. DO NOT TK WITH CALCIUM PRODUCTS. CAN TK AN HOUR B OR 2 H AFTER  0    metoprolol tartrate (LOPRESSOR) 50 mg tablet TK 1 T PO D FOR BP  3    lidocaine (LIDODERM) 5 % Apply patch to the affected area for 12 hours a day and remove for 12 hours a day. 8 Each 0    ibuprofen (MOTRIN) 600 mg tablet Take 1 Tab by mouth every six (6) hours as needed for Pain. 20 Tab 0    oxyCODONE IR (ROXICODONE) 5 mg immediate release tablet Take 1 Tab by mouth every eight (8) hours as needed for Pain for up to 3 days. Max Daily Amount: 15 mg. 7 Tab 0    tamsulosin (FLOMAX) 0.4 mg capsule Take 1 Cap by mouth daily (after dinner). 30 Cap 0    oxyCODONE-acetaminophen (ROXICET) 5-325 mg per tablet Take 1 Tab by mouth every six (6) hours as needed for Pain. Max Daily Amount: 4 Tabs. 25 Tab 0    tamsulosin (FLOMAX) 0.4 mg capsule   0    HYDROcodone-acetaminophen (NORCO)  mg tablet Take 1 Tab by mouth every six (6) hours as needed for Pain. Max Daily Amount: 4 Tabs.  30 Tab 0        ALLERGIES  No Known Allergies       SOCIAL HISTORY    Social History     Socioeconomic History    Marital status:      Spouse name: Not on file    Number of children: Not on file    Years of education: Not on file    Highest education level: Not on file   Occupational History    Not on file   Social Needs    Financial resource strain: Not on file    Food insecurity:     Worry: Not on file     Inability: Not on file    Transportation needs:     Medical: Not on file     Non-medical: Not on file   Tobacco Use    Smoking status: Former Smoker    Smokeless tobacco: Never Used   Substance and Sexual Activity    Alcohol use: Yes     Alcohol/week: 0.0 standard drinks    Drug use: No     Types: Marijuana    Sexual activity: Not on file   Lifestyle    Physical activity:     Days per week: Not on file     Minutes per session: Not on file    Stress: Not on file   Relationships    Social connections:     Talks on phone: Not on file     Gets together: Not on file     Attends Jain service: Not on file     Active member of club or organization: Not on file     Attends meetings of clubs or organizations: Not on file     Relationship status: Not on file    Intimate partner violence:     Fear of current or ex partner: Not on file     Emotionally abused: Not on file     Physically abused: Not on file     Forced sexual activity: Not on file   Other Topics Concern     Service Not Asked    Blood Transfusions Not Asked    Caffeine Concern Not Asked    Occupational Exposure Not Asked   Mary Kate Duos Hazards Not Asked    Sleep Concern Not Asked    Stress Concern Not Asked    Weight Concern Not Asked    Special Diet Not Asked    Back Care Not Asked    Exercise Not Asked    Bike Helmet Not Asked   [address] Not Asked    Self-Exams Not Asked   Social History Narrative    Not on file       FAMILY HISTORY  No family history on file.       Rena Guan NP

## 2019-10-11 NOTE — ED PROVIDER NOTES
EMERGENCY DEPARTMENT HISTORY AND PHYSICAL EXAM    10:25 AM      Date: 10/11/2019  Patient Name: Leo Spaulding    History of Presenting Illness     Chief Complaint   Patient presents with    Hypertension         History Provided By: Patient      Additional History (Context): Leo Spaulding is a 64 y.o. male with hypertension and Chronic back pain who presents with high blood pressure, sent by ambulance from his spine doctor. Patient states he did not take his blood pressure medication, he was sent because his blood pressure was in the 829Y systolic however he knows why it was high, he had walked a very long distance from his vehicle and his back was hurting him horribly by the time he got into the triage room at the clinic. He denies any other symptoms except his back pain. He notes he has been having trouble even lifting his left foot which is why he has been seeking care, but this is been for the past 1.5 weeks. Denies any bowel or bladder dysfunction, no saddle anesthesia, no IV drug abuse    PCP: Mari Noel MD    Current Outpatient Medications   Medication Sig Dispense Refill    ciprofloxacin HCl (CIPRO) 500 mg tablet TK 1 T PO TWICE DAILY. DO NOT TK WITH CALCIUM PRODUCTS. CAN TK AN HOUR B OR 2 H AFTER  0    metoprolol tartrate (LOPRESSOR) 50 mg tablet TK 1 T PO D FOR BP  3    lidocaine (LIDODERM) 5 % Apply patch to the affected area for 12 hours a day and remove for 12 hours a day. 8 Each 0    ibuprofen (MOTRIN) 600 mg tablet Take 1 Tab by mouth every six (6) hours as needed for Pain. 20 Tab 0    oxyCODONE IR (ROXICODONE) 5 mg immediate release tablet Take 1 Tab by mouth every eight (8) hours as needed for Pain for up to 3 days. Max Daily Amount: 15 mg. 7 Tab 0    tamsulosin (FLOMAX) 0.4 mg capsule Take 1 Cap by mouth daily (after dinner). 30 Cap 0    oxyCODONE-acetaminophen (ROXICET) 5-325 mg per tablet Take 1 Tab by mouth every six (6) hours as needed for Pain.  Max Daily Amount: 4 Tabs. 25 Tab 0    tamsulosin (FLOMAX) 0.4 mg capsule   0    HYDROcodone-acetaminophen (NORCO)  mg tablet Take 1 Tab by mouth every six (6) hours as needed for Pain. Max Daily Amount: 4 Tabs. 30 Tab 0       Past History     Past Medical History:  Past Medical History:   Diagnosis Date    Difficulty walking     HX OTHER MEDICAL     thin stomach lining    Hypertension     Insomnia     Nephrolithiasis     Numbness in both legs     Recurrent kidney stones     Snoring     Weakness        Past Surgical History:  Past Surgical History:   Procedure Laterality Date    HX BACK SURGERY      several    HX CHOLECYSTECTOMY      HX HERNIA REPAIR      x 15       Family History:  No family history on file. Social History:  Social History     Tobacco Use    Smoking status: Former Smoker    Smokeless tobacco: Never Used   Substance Use Topics    Alcohol use: Yes     Alcohol/week: 0.0 standard drinks    Drug use: No     Types: Marijuana       Allergies:  No Known Allergies      Review of Systems       Review of Systems   Constitutional: Negative. Negative for activity change, chills and fever. HENT: Negative. Negative for ear pain, hearing loss and sore throat. Eyes: Negative. Negative for pain and visual disturbance. Respiratory: Negative. Negative for cough, chest tightness and shortness of breath. Cardiovascular: Negative. Negative for chest pain and leg swelling. Gastrointestinal: Negative. Negative for abdominal distention and abdominal pain. Genitourinary: Negative. Negative for dysuria and hematuria. Musculoskeletal: Positive for back pain. Skin: Negative. Negative for rash. Neurological: Negative. Negative for dizziness and headaches. Psychiatric/Behavioral: Negative. Negative for agitation and behavioral problems. Physical Exam     Visit Vitals  SpO2 100%         Physical Exam   Constitutional: He is oriented to person, place, and time.  He appears well-developed and well-nourished. HENT:   Head: Normocephalic and atraumatic. Eyes: Pupils are equal, round, and reactive to light. EOM are normal. Right eye exhibits no discharge. Left eye exhibits no discharge. Neck: Normal range of motion. Neck supple. No JVD present. No tracheal deviation present. Cardiovascular: Normal rate, regular rhythm and normal heart sounds. No murmur heard. Pulmonary/Chest: Effort normal and breath sounds normal. No respiratory distress. He has no wheezes. He has no rales. Abdominal: Soft. Bowel sounds are normal. He exhibits no distension. There is no tenderness. There is no rebound. Musculoskeletal: Normal range of motion. He exhibits no tenderness or deformity. Neurological: He is alert and oriented to person, place, and time. No cranial nerve deficit. Normal strength and sensation in all extremities except the left lower, decreased sensation, 3 out of 5 strength in the left lower extremity, no clonus, hyperreflexia     Skin: Skin is warm and dry. No rash noted. No erythema. Psychiatric: He has a normal mood and affect. His behavior is normal.         Diagnostic Study Results     Labs -  No results found for this or any previous visit (from the past 12 hour(s)). Radiologic Studies -   MRI LUMB SPINE W WO CONT   Final Result   IMPRESSION:   1. Transitional lumbosacral anatomy as above. Recommend confirmation of   appropriate level prior to performing intervention. 2.  Postsurgical at L5/L6 with moderate residual osteophyte formation at L5/L6   but no definite residual or recurrent disc herniation. Minimal spinal canal   narrowing at L5/L6 and mild to moderate left and mild right foraminal stenosis   predominantly on the basis of facet arthropathy. Questionable impingement of the   crossing left L6 nerve root due due to left facet joint osteophytes. 3.  Small annular fissure at the left subarticular through foraminal zones at   L4/L5 without significant disc herniation. 4. Additional lower grade degenerative changes as above. Medical Decision Making   I am the first provider for this patient. I reviewed the vital signs, available nursing notes, past medical history, past surgical history, family history and social history. Vital Signs-Reviewed the patient's vital signs. Records Reviewed: Nursing Notes and Old Medical Records      Provider Notes (Medical Decision Making):     MDM  Number of Diagnoses or Management Options  Chronic left-sided low back pain without sciatica:   Essential hypertension:   Foot drop, left:   Diagnosis management comments: Differential Diagnosis: Essential hypertension, cauda equina, nerve impingement, spinal nerve entrapment, cord compression    Patient presenting for hypertension, likely due to pain and exertion, he is 165/100 here, will give his home meds, no need for further treatment. Discussed with mid-level at spinal center where patient was sent from will, they want an emergent MRI for patients relatively new deficits. Explained that this is not necessarily absolutely emergent although understand the urgent nature, agreed to pursue this if it was able to be done within a reasonable amount of time. Discussed with MRI who is able to fit him in at 1:30 PM, will order the MRI to rule out any acute cord impingement or compression however do not suspect discitis or osteomyelitis or SEA or cauda equina    Reevaluation: no formal read on MRI yet but Dr. Ten Yun, pt's ortho spine, has read imaging and confirmed no acute need for hospitalization, recommends f/u with him and with urology. Safe for d/c, careful return precautions given. Pt/family comfortable with plan    Josias Diana MD        Diagnosis     Clinical Impression:   1. Chronic left-sided low back pain without sciatica    2. Foot drop, left    3.  Essential hypertension        Disposition: home    Follow-up Information     Follow up With Specialties Details Why Contact Info    Tex Kingston MD Urology In 1 week regarding your kidney stones [address]      [facility] EMERGENCY DEPT Emergency Medicine  As needed, If symptoms worsen [facility] [id number]  [phone]           Discharge Medication List as of 10/11/2019  6:21 PM      CONTINUE these medications which have NOT CHANGED    Details   lidocaine (LIDODERM) 5 % Apply patch to the affected area for 12 hours a day and remove for 12 hours a day., Print, Disp-8 Each, R-0      ibuprofen (MOTRIN) 600 mg tablet Take 1 Tab by mouth every six (6) hours as needed for Pain., Normal, Disp-20 Tab, R-0      oxyCODONE IR (ROXICODONE) 5 mg immediate release tablet Take 1 Tab by mouth every eight (8) hours as needed for Pain for up to 3 days. Max Daily Amount: 15 mg., Print, Disp-7 Tab, R-0      ciprofloxacin HCl (CIPRO) 500 mg tablet TK 1 T PO TWICE DAILY. DO NOT TK WITH CALCIUM PRODUCTS. CAN TK AN HOUR B OR 2 H AFTER, Historical Med, R-0      metoprolol tartrate (LOPRESSOR) 50 mg tablet TK 1 T PO D FOR BP, Historical Med, R-3      !! tamsulosin (FLOMAX) 0.4 mg capsule Take 1 Cap by mouth daily (after dinner). , Normal, Disp-30 Cap, R-0      oxyCODONE-acetaminophen (ROXICET) 5-325 mg per tablet Take 1 Tab by mouth every six (6) hours as needed for Pain. Max Daily Amount: 4 Tabs., Print, Disp-25 Tab, R-0      !! tamsulosin (FLOMAX) 0.4 mg capsule Historical Med, R-0      HYDROcodone-acetaminophen (NORCO)  mg tablet Take 1 Tab by mouth every six (6) hours as needed for Pain. Max Daily Amount: 4 Tabs., Print, Disp-30 Tab, R-0       !! - Potential duplicate medications found. Please discuss with provider.        _______________________________    Please note that this dictation was completed with [employer] the [employer] voice recognition software.   Quite often unanticipated grammatical, syntax, homophones, and other interpretive errors are inadvertently transcribed by the computer software. Please disregard these errors. Please excuse any errors that have escaped final proofreading.

## 2019-10-11 NOTE — DISCHARGE INSTRUCTIONS
Patient Education        Learning About Relief for Back Pain  What is back tension and strain? Back strain happens when you overstretch, or pull, a muscle in your back. You may hurt your back in an accident or when you exercise or lift something. Most back pain will get better with rest and time. You can take care of yourself at home to help your back heal.  What can you do first to relieve back pain? When you first feel back pain, try these steps:  · Walk. Take a short walk (10 to 20 minutes) on a level surface (no slopes, hills, or stairs) every 2 to 3 hours. Walk only distances you can manage without pain, especially leg pain. · Relax. Find a comfortable position for rest. Some people are comfortable on the floor or a medium-firm bed with a small pillow under their head and another under their knees. Some people prefer to lie on their side with a pillow between their knees. Don't stay in one position for too long. · Try heat or ice. Try using a heating pad on a low or medium setting, or take a warm shower, for 15 to 20 minutes every 2 to 3 hours. Or you can buy single-use heat wraps that last up to 8 hours. You can also try an ice pack for 10 to 15 minutes every 2 to 3 hours. You can use an ice pack or a bag of frozen vegetables wrapped in a thin towel. There is not strong evidence that either heat or ice will help, but you can try them to see if they help. You may also want to try switching between heat and cold. · Take pain medicine exactly as directed. ¨ If the doctor gave you a prescription medicine for pain, take it as prescribed. ¨ If you are not taking a prescription pain medicine, ask your doctor if you can take an over-the-counter medicine. What else can you do? · Stretch and exercise. Exercises that increase flexibility may relieve your pain and make it easier for your muscles to keep your spine in a good, neutral position. And don't forget to keep walking. · Do self-massage.  You can use self-massage to unwind after work or school or to energize yourself in the morning. You can easily massage your feet, hands, or neck. Self-massage works best if you are in comfortable clothes and are sitting or lying in a comfortable position. Use oil or lotion to massage bare skin. · Reduce stress. Back pain can lead to a vicious [place]: Distress about the pain tenses the muscles in your back, which in turn causes more pain. Learn how to relax your mind and your muscles to lower your stress. Where can you learn more? Go to http://earline-edel.info/. Enter A718 in the search box to learn more about \"Learning About Relief for Back Pain. \"  Current as of: March 21, 2017  Content Version: 11.5  © 0165-8938 Healthwise, Inc[employer]. Care instructions adapted under license by [employer] (which disclaims liability or warranty for this information). If you have questions about a medical condition or this instruction, always ask your healthcare professional. [address] any warranty or liability for your use of this information.

## 2019-10-11 NOTE — PROGRESS NOTES
Nikolai Downs presents today for   Chief Complaint   Patient presents with    New Patient    Leg Pain     left    Buttocks pain     left    Back Pain     left       Is someone accompanying this pt? No    Is the patient using any DME equipment during OV? No    Depression Screening:  3 most recent PHQ Screens 10/11/2019   Little interest or pleasure in doing things Not at all   Feeling down, depressed, irritable, or hopeless Not at all   Total Score PHQ 2 0       Learning Assessment:  No flowsheet data found. Abuse Screening:  Abuse Screening Questionnaire 10/11/2019   Do you ever feel afraid of your partner? N   Are you in a relationship with someone who physically or mentally threatens you? N   Is it safe for you to go home? Y       Fall Risk  Fall Risk Assessment, last 12 mths 10/11/2019   Able to walk? Yes   Fall in past 12 months? No         Coordination of Care:  1. Have you been to the ER, urgent care clinic since your last visit? Yes  Hospitalized since your last visit? No    2. Have you seen or consulted any other health care providers outside of the [address] [place] [address] since your last visit? Yes Include any pap smears or colon screening.  No    Last  Checked Today

## 2019-10-11 NOTE — PROGRESS NOTES
Have reviewed MRI  No evidence of any nerve root compression or instability. No evidence of any acute spinal pathology. Cannot not explain patients acute complaints on basis of spine.

## 2019-10-14 VITALS — BODY MASS INDEX: 26.5 KG/M2 | WEIGHT: 190 LBS | OXYGEN SATURATION: 100 %

## 2019-10-14 RX ORDER — GADOTERATE MEGLUMINE 376.9 MG/ML
18 INJECTION INTRAVENOUS
OUTPATIENT
Start: 2019-10-11 | End: 2019-10-12

## 2019-10-14 RX ORDER — GADOTERATE MEGLUMINE 376.9 MG/ML
18 INJECTION INTRAVENOUS
Status: COMPLETED | OUTPATIENT
Start: 2019-10-14 | End: 2019-10-11